# Patient Record
Sex: FEMALE | URBAN - METROPOLITAN AREA
[De-identification: names, ages, dates, MRNs, and addresses within clinical notes are randomized per-mention and may not be internally consistent; named-entity substitution may affect disease eponyms.]

---

## 2023-06-20 ENCOUNTER — APPOINTMENT (RX ONLY)
Dept: URBAN - METROPOLITAN AREA CLINIC 55 | Facility: CLINIC | Age: 53
Setting detail: DERMATOLOGY
End: 2023-06-20

## 2023-06-20 DIAGNOSIS — Z41.9 ENCOUNTER FOR PROCEDURE FOR PURPOSES OTHER THAN REMEDYING HEALTH STATE, UNSPECIFIED: ICD-10-CM

## 2023-06-20 PROCEDURE — ? COSMETIC CONSULTATION: GENERAL

## 2023-06-20 PROCEDURE — ? INVENTORY

## 2023-06-22 ENCOUNTER — APPOINTMENT (RX ONLY)
Dept: URBAN - METROPOLITAN AREA CLINIC 55 | Facility: CLINIC | Age: 53
Setting detail: DERMATOLOGY
End: 2023-06-22

## 2023-06-22 DIAGNOSIS — Z41.9 ENCOUNTER FOR PROCEDURE FOR PURPOSES OTHER THAN REMEDYING HEALTH STATE, UNSPECIFIED: ICD-10-CM

## 2023-06-22 PROCEDURE — ? INVENTORY

## 2023-06-22 PROCEDURE — ? SCULPTRA

## 2023-06-22 NOTE — PROCEDURE: SCULPTRA
Right [facility] Filler Volume In Cc: 0
Show Tear Troughs Volume?: Yes
Show Right And Left Lateral Face Volume?: No
Anesthesia Volume In Cc: 0.5
Vials Reconstituted (Required For Inventory): 1
Injection Technique: The Sculptra was injected to the listed areas after cleansing the skin and providing appropriate anesthesia.
Map Statement: See Attached Map for Complete Details.
Consent was obtained.
Detail Level: Detailed
Show Inventory Tab: Hide
Additional Anesthesia Volume In Cc: 6
Post-Care Instructions: Patient instructed to apply ice to reduce swelling.
Anesthesia Type: 1% lidocaine with epinephrine
Dilution Method: The Sculptra was diluted with 8 ml of sterile water and 2 ml 1% lidocaine for a total volume of 10ccs for each vial.